# Patient Record
(demographics unavailable — no encounter records)

---

## 2024-12-19 NOTE — P.ANPRN
Procedure Note - Anesthesia





- Nerve Block Performed


  ** Right Adductor Canal Single


Time Out Performed: Yes


Date of Procedure: 12/19/24


Procedure Start Time: 09:13


Procedure Stop Time: 09:16


Location of Patient: PreOp


Indication: Acute Post-Operative Pain, Requested by Surgeon


Sedation Type: Sedate with meaningful contact maintained


Preparation: Sterile Prep


Position: Supine


Needle Types: Pajunk


Needle Gauge: 21


Ultrasound used to visualize needle placement: Yes


Ultrasound used to observe medication spread: Yes


Blood Aspirated: No


Pain Paresthesia on Injection Noted: No


Resistance on Injection: Normal


Image Stored and Saved: Yes


Events: Uneventful and Well Tolerated (Ropivacaine 0.5% 20 cc plus dexamethasone

4 mg)

## 2024-12-19 NOTE — HP
HISTORY AND PHYSICAL



DATE OF SURGERY:

12/19/2024.



HISTORY OF PRESENT ILLNESS:

Vern Espinoza is a 29-year-old gentleman seen with right knee ACL tear.  We

discussed options regarding treatment.  He elected to proceed with right knee

arthroscopy to include allograft ACL reconstruction.  Consent was obtained.



PAST MEDICAL HISTORY:

Noncontributory.



PAST SURGICAL HISTORY:

Noncontributory.



DAILY MEDICATIONS:

1. Motrin.

2. Tylenol.



ALLERGIES:

None.



SOCIAL HISTORY:

Denies tobacco use.



PHYSICAL EVALUATION OF THE RIGHT KNEE:

His range of motion is -2/3 to 110 degrees, tenderness, medial joint line.  +2 to 3

Lachman, no endpoint, +1 MCL, solid endpoint.  Distal neurovascular exam intact.



IMAGING STUDIES:

Right knee radiographs revealed an effusion.  MRI of right knee revealed ACL tear.



IMPRESSION:

Internal derangement of right knee with ACL tear.



PLAN:

Right knee arthroscopy with allograft ACL reconstruction and debridement.





MMODL / IJN: [id number] / Job#: [id number]

## 2024-12-19 NOTE — P.OP
Date of Procedure: 12/19/24


Preoperative Diagnosis: 


Internal derangement right knee


Postoperative Diagnosis: 


 1.  ACL tear right knee


2.  Medial and lateral meniscal tears right knee


3.  Reactive synovitis medial, lateral and suprapatellar compartments right knee


Procedure(s) Performed: 


1.  Arthroscopic allograft anterior cruciate ligament reconstruction right knee


2.  Arthroscopic partial medial and lateral meniscectomy right knee


3.  Arthroscopic partial synovectomy medial, lateral and suprapatellar 

compartments right knee


Implants: 


2Arthrex Endobutton's


Anesthesia: MENG, local


Surgeon: Shemar Cherry


Assistant #1: Oswald Hay


Estimated Blood Loss (ml): 15


Pathology: none sent


Condition: stable


Disposition: PACU


Indications for Procedure: 


29-year-old patient seen with progressive right knee instability and pain.  He 

had a history of ACL tear and resultant significant symptomatic instability.  

After having discussion regarding treatment he elected to proceed with 

arthroscopy to include allograft ACL reconstruction.


Operative Findings: 


See description of procedure


Description of Procedure: 


Patient was taken to the operative suite.  Patient underwent a general 

anesthetic by the department of anesthesia.  Patient was given preoperative 

antibiotics.  The right lower extremity was placed in a well-padded arthroscopic

leg hou.  The right leg was prepped and draped in the normal sterile 

orthopedic fashion.  A lateral parapatellar and suprapatellar incision was made.

 Trochars were inserted.  Arthroscopy was initiated.  Suprapatellar pouch 

revealed diffuse thick reactive synovitis.  The patellofemoral joint appeared to

articulate congruently.  There was early grade I chondromalacia without 

significant tearing.  The scope was guided into the medial gutter.  No loose bod

ies or plica identified.  The scope was then guided into the medial compartment.

 A medial parapatellar incision was made.  Trocar inserted followed by probe.  

Radial tear posterior medial meniscus was evident.  There was no chondromalacia 

present there was some reactive synovitis anteriorly.   Scope and probe were 

then guided into the intercondylar notch.  There was an obvious complete ACL 

tear.  The PCL appeared intact.  At this point we opened up an allograft and 

Allen PANTOJA began preparing that for implantation.  I guided the scope back 

into the medial compartment.  I performed a partial medial meniscectomy getting 

down to stable meniscal tissue.  I performed a partial synovectomy decompressing

the reactive synovitis.  The residual meniscus was probed and was found to be 

stable.  There was good decompression of the synovitis..  The scope and probe 

were then guided into lateral compartment.  There was a radial tear lateral 

meniscus mid body area.  There was thick reactive synovitis anteriorly.  There 

was no significant chondromalacia.  I performed a partial lateral meniscectomy 

getting down to stable meniscal tissue.  I performed a partial synovectomy 

decompressing the reactive synovitis.  The residual meniscus was stable.  There 

was good decompression of the synovitis.  I guided the scope back into the 

intercondylar notch.  I debrided the remnant of the ACL tear.  I performed a 

notchplasty as the patient did have a very tight notch.  The PCL was intact.  At

this point the graft was ready for implantation.  I with the assistance of Allen PANTOJA created all inside femoral and tibial tunnels.  Shell sutures were 

passed through the cells.  The graft was now brought to the operative field.  I 

shuttled the femoral side of the graft into our femoral tunnel flipping the 

Endobutton along the lateral cortex and then getting about 20 mm of graft in the

tunnel.  We now shuttled the tibial side of the graft into the tibial tunnel and

pulled it down and secured it.  We had nice positioning of the graft.  We had 

good range of motion of the knee with full extension.  We now took the knee into

full extension.  The scope was removed from the joint.  We now extended our m

edial incision and introduced an Endobutton securing the tibial side of our 

graft.  We did incorporate internal brace and secured that to her proximal tibia

utilizing a 4.75 swivel lock anchor.  Residual suture limbs were clipped.  We 

now put the scope back into the joint.  We noted the ACL.  Allograft to be in 

excellent position providing excellent stability about the knee.  The scope was 

in guided back into the suprapatellar compartment.  I reduced a motorized shaver

into the supra compartment.  I performed a partial synovectomy.  The shaver was 

removed.  There was good decompression of the synovitis.  I now took a look 

around the entire knee 1 more time and I noted no residual debris.  Instruments 

were now removed from the joint.  The joint was infiltrated with .25% Marcaine. 

Both the portal sites and then many medial incision were repaired with nylon 

suture.  Sterile dressings were applied.  The patient was placed into a soft 

knee immobilizer.  No tourniquet was utilized.  Allen PANTOJA assisted in all 

aspects of this procedure.  The patient was awakened, transferred to a bed and 

taken to recovery stable satisfactory condition.